# Patient Record
Sex: FEMALE | URBAN - METROPOLITAN AREA
[De-identification: names, ages, dates, MRNs, and addresses within clinical notes are randomized per-mention and may not be internally consistent; named-entity substitution may affect disease eponyms.]

---

## 2020-08-17 ENCOUNTER — TELEPHONE ENCOUNTER (OUTPATIENT)
Dept: URBAN - METROPOLITAN AREA CLINIC 92 | Facility: CLINIC | Age: 55
End: 2020-08-17

## 2020-08-17 ENCOUNTER — OFFICE VISIT (OUTPATIENT)
Dept: URBAN - METROPOLITAN AREA CLINIC 17 | Facility: CLINIC | Age: 55
End: 2020-08-17
Payer: OTHER GOVERNMENT

## 2020-08-17 DIAGNOSIS — K21.0 GERD WITH ESOPHAGITIS: ICD-10-CM

## 2020-08-17 DIAGNOSIS — M79.604 RIGHT LEG PAIN: ICD-10-CM

## 2020-08-17 DIAGNOSIS — A04.8 H. PYLORI INFECTION: ICD-10-CM

## 2020-08-17 DIAGNOSIS — E55.9 VITAMIN D DEFICIENCY DISEASE: ICD-10-CM

## 2020-08-17 DIAGNOSIS — R10.13 DYSPEPSIA: ICD-10-CM

## 2020-08-17 PROBLEM — 34713006: Status: ACTIVE | Noted: 2020-08-17

## 2020-08-17 PROBLEM — 266433003: Status: ACTIVE | Noted: 2020-08-17

## 2020-08-17 PROBLEM — 162031009: Status: ACTIVE | Noted: 2020-08-17

## 2020-08-17 PROBLEM — 721730009: Status: ACTIVE | Noted: 2020-08-17

## 2020-08-17 PROBLEM — 287048003: Status: ACTIVE | Noted: 2020-08-17

## 2020-08-17 PROCEDURE — 99214 OFFICE O/P EST MOD 30 MIN: CPT | Performed by: INTERNAL MEDICINE

## 2020-08-17 RX ORDER — PANTOPRAZOLE SODIUM 40 MG/1
1 TABLET TABLET, DELAYED RELEASE ORAL ONCE A DAY
Qty: 90 | Refills: 1 | OUTPATIENT
Start: 2020-08-17

## 2020-08-17 NOTE — HPI-TODAY'S VISIT:
The pt states that she is feeling well today except for right lower leg pain. She has a history of h. pylori infection in the fall of 10/2019 and it is not clear if the pt took the medication.  She notes that she is feeling well. The pt had h. pylor infection and took antibiiotics with clearance of the infectdion. She notes that her BM's are regular.

## 2021-02-19 ENCOUNTER — OFFICE VISIT (OUTPATIENT)
Dept: URBAN - METROPOLITAN AREA CLINIC 17 | Facility: CLINIC | Age: 56
End: 2021-02-19
Payer: OTHER GOVERNMENT

## 2021-02-19 DIAGNOSIS — R63.8 DIETARY INDISCRETION: ICD-10-CM

## 2021-02-19 DIAGNOSIS — K21.00 GASTROESOPHAGEAL REFLUX DISEASE WITH ESOPHAGITIS WITHOUT HEMORRHAGE: ICD-10-CM

## 2021-02-19 DIAGNOSIS — K92.89 GAS BLOAT SYNDROME: ICD-10-CM

## 2021-02-19 DIAGNOSIS — I10 ESSENTIAL HYPERTENSION: ICD-10-CM

## 2021-02-19 DIAGNOSIS — K57.30 DIVERTICULAR DISEASE OF COLON: ICD-10-CM

## 2021-02-19 DIAGNOSIS — F51.01 PRIMARY INSOMNIA: ICD-10-CM

## 2021-02-19 PROBLEM — 733657002: Status: ACTIVE | Noted: 2021-02-19

## 2021-02-19 PROBLEM — 3972004: Status: ACTIVE | Noted: 2021-02-19

## 2021-02-19 PROBLEM — 59621000: Status: ACTIVE | Noted: 2021-02-19

## 2021-02-19 PROCEDURE — 99214 OFFICE O/P EST MOD 30 MIN: CPT | Performed by: INTERNAL MEDICINE

## 2021-02-19 PROCEDURE — 1036F TOBACCO NON-USER: CPT | Performed by: INTERNAL MEDICINE

## 2021-02-19 PROCEDURE — G8754 DIAS BP LESS 90: HCPCS | Performed by: INTERNAL MEDICINE

## 2021-02-19 PROCEDURE — G8417 CALC BMI ABV UP PARAM F/U: HCPCS | Performed by: INTERNAL MEDICINE

## 2021-02-19 PROCEDURE — 3017F COLORECTAL CA SCREEN DOC REV: CPT | Performed by: INTERNAL MEDICINE

## 2021-02-19 PROCEDURE — G8752 SYS BP LESS 140: HCPCS | Performed by: INTERNAL MEDICINE

## 2021-02-19 PROCEDURE — G8482 FLU IMMUNIZE ORDER/ADMIN: HCPCS | Performed by: INTERNAL MEDICINE

## 2021-02-19 RX ORDER — AMITRIPTYLINE HYDROCHLORIDE 50 MG/1
1 TABLET AT BEDTIME TABLET, FILM COATED ORAL ONCE A DAY
Qty: 90 TABLET | Refills: 3 | OUTPATIENT
Start: 2021-02-19

## 2021-02-19 RX ORDER — PANTOPRAZOLE SODIUM 40 MG/1
1 TABLET TABLET, DELAYED RELEASE ORAL ONCE A DAY
Qty: 90 | Refills: 1 | COMMUNITY
Start: 2020-08-17

## 2021-02-19 NOTE — HPI-OTHER HISTORIES
The pt with history of GERD, gas and bloating, who presents for evaluation of increased heartburn and indigestion. The pt notes that she is having increased heartburn and indigestion as she notes that she is eating increased citrus fruits.

## 2021-02-19 NOTE — PHYSICAL EXAM GASTROINTESTINAL
Abdomen , soft, nontender, nondistended , no guarding or rigidity , no masses palpable , normal bowel sounds , Liver and Spleen , no hepatomegaly present , no hepatosplenomegaly , liver nontender , spleen not palpable Normal

## 2021-05-07 ENCOUNTER — OFFICE VISIT (OUTPATIENT)
Dept: URBAN - METROPOLITAN AREA CLINIC 17 | Facility: CLINIC | Age: 56
End: 2021-05-07

## 2021-05-12 ENCOUNTER — DASHBOARD ENCOUNTERS (OUTPATIENT)
Age: 56
End: 2021-05-12

## 2021-05-21 ENCOUNTER — OFFICE VISIT (OUTPATIENT)
Dept: URBAN - METROPOLITAN AREA CLINIC 17 | Facility: CLINIC | Age: 56
End: 2021-05-21

## 2021-05-21 RX ORDER — PANTOPRAZOLE SODIUM 40 MG/1
1 TABLET TABLET, DELAYED RELEASE ORAL ONCE A DAY
Qty: 90 | Refills: 1 | Status: ACTIVE | COMMUNITY
Start: 2020-08-17

## 2021-05-21 RX ORDER — AMITRIPTYLINE HYDROCHLORIDE 50 MG/1
1 TABLET AT BEDTIME TABLET, FILM COATED ORAL ONCE A DAY
Qty: 90 TABLET | Refills: 3 | Status: ACTIVE | COMMUNITY
Start: 2021-02-19